# Patient Record
Sex: MALE | Race: WHITE
[De-identification: names, ages, dates, MRNs, and addresses within clinical notes are randomized per-mention and may not be internally consistent; named-entity substitution may affect disease eponyms.]

---

## 2018-10-02 ENCOUNTER — HOSPITAL ENCOUNTER (EMERGENCY)
Dept: HOSPITAL 80 - FED | Age: 68
Discharge: HOME | End: 2018-10-02
Payer: MEDICAID

## 2018-10-02 VITALS — DIASTOLIC BLOOD PRESSURE: 103 MMHG | SYSTOLIC BLOOD PRESSURE: 138 MMHG

## 2018-10-02 DIAGNOSIS — Z85.51: ICD-10-CM

## 2018-10-02 DIAGNOSIS — Z79.4: ICD-10-CM

## 2018-10-02 DIAGNOSIS — M16.12: Primary | ICD-10-CM

## 2018-10-02 DIAGNOSIS — E11.9: ICD-10-CM

## 2018-10-02 DIAGNOSIS — Z87.891: ICD-10-CM

## 2018-10-02 NOTE — EDPHY
General


Time Seen by Provider: 10/02/18 10:03


Narrative: 





CHIEF COMPLAINT: 


Hip pain





HISTORY OF PRESENT ILLNESS: 


Patient presents by private vehicle with complaints of left hip pain. pain has 

been present since January.  He states that he has been seen several times by 

[facility] and [facility] with degenerative hip.  He has a 

pending MRI but does not have an appointment.  He has been taking naproxen and 

Tylenol with no improvement.  He has no new trauma or injury.  No numbness, 

tingling or weakness.  No redness or warmth.  No fever.  He is here asking for 

pain control to get him to his MRI.  No other associated complaints or 

modifying factors.





REVIEW OF SYSTEMS:


10 systems were reviewed and negative with the exception of the elements 

mentioned in the history of present illness.





PCP:


[facility]





SPECIALISTS:


Pending orthopedic follow-up





PAST MEDICAL HISTORY: 


Osteoarthritis, degenerative disease, asthma, bladder cancer, diabetes mellitus





PAST SURGICAL HISTORY:


Cystectomy.  No recent surgeries





SOCIAL HISTORY:


Lives independently.





FAMILY HISTORY:


Noncontributory





EXAMINATION:


General Appearance:  Alert, no distress.  Ambulatory with cane.


Head: normocephalic, atraumatic


Cardiovascular:  Regular rate.  Symmetric DP and PT pulses 2+.  Good signs of 

perfusion of both lower extremities.


Back: non-tender, no bony abnormalities


Neurological:  A&O, nonfocal, antalgic but steady gait.  Strength is symmetric 

in both knees at 5/5.  Patellar reflexes symmetric


Skin:  Warm and dry, no rash.  No cellulitis.  No petechiae.  No purpura


Extremities:  Tenderness of the left greater trochanter and left inguinal fold.

  There is no bony tenderness of the pelvis, knees, shins or ankles.  Range of 

motion symmetric to both lower extremities passive and active.


Psychiatric:  Mood and affect normal





DIFFERENTIAL DIAGNOSES:


Including but not limited to osteoarthritis, labrum tear, dislocation, 

subluxation, septic joint, toxic synovitis








MDM:


10:10 a.m.


Left hip pain that is chronic in nature with diagnosis of osteoarthritis/

degenerative disease.  There is no evidence of toxic synovitis or septic joint.

  There is no cellulitis.  There is full range of motion of the hip with no 

acute changes.  His only request is pain control.  He reports a a normal x-ray 

and CT scan over the past 2 months.  He has not been on any narcotics for this 

and I have verified this.  I have ordered short course of pain medication for 

him and he will continue his workup with [facility] with a pending MR ARACELIS. 

We discussed ED precautions for redness, warmth, fever or worsening pain.  He 

is ambulatory and comfortable this plan.  Discharged home stable condition.








SUPERVISION:


This patient was independently evaluated without direct involvement of or 

examination by the attending physician. 





CONSULTATION:


None











- History


Smoking Status: Former smoker





- Objective


Vital Signs: 


 Initial Vital Signs











Temperature (C)  98.6 F   10/02/18 09:28


 


Heart Rate  78   10/02/18 09:28


 


Respiratory Rate  16   10/02/18 09:28


 


Blood Pressure  138/103 H  10/02/18 09:28


 


O2 Sat (%)  94   10/02/18 09:28








 











O2 Delivery Mode               Room Air














Allergies/Adverse Reactions: 


 





No Known Allergies Allergy (Verified 10/02/18 09:27)


 








Home Medications: 














 Medication  Instructions  Recorded


 


Albuterol  10/02/18


 


Metformin 1000 mg  10/02/18


 


oxyCODONE HCL/ACETAMINOPHEN 1 each PO Q4-6PRN PRN #13 tablet 10/02/18





[Percocet 5-325 mg Tablet]  














Departure





- Departure


Disposition: Home, Routine, Self-Care


Clinical Impression: 


 Left hip pain





Osteoarthritis of left hip


Qualifiers:


 Osteoarthritis type: primary Qualified Code(s): M16.12 - Unilateral primary 

osteoarthritis, left hip





Condition: Good


Instructions:  Arthralgia (ED), Hip Pain (ED)


Additional Instructions: 


1. Continue your previous naproxen medication


2. Pain medication as prescribed as needed.  Do not combine with alcohol, 

marijuana or acetaminophen


3. Contact the [facility] for outpatient follow-up for MRI of the left hip


4. I have also provided the on-call orthopedist for outpatient follow-up if 

needed


5. Return here for any redness over the left hip, fever, worsening pain or 

inability to ambulate


Referrals: 


Genaro Gamez MD [Medical Doctor] - As per Instructions


Prescriptions: 


oxyCODONE HCL/ACETAMINOPHEN [Percocet 5-325 mg Tablet] 1 each PO Q4-6PRN PRN #

13 tablet


 PRN Reason: Pain, Breakthrough

## 2019-01-10 ENCOUNTER — HOSPITAL ENCOUNTER (EMERGENCY)
Dept: HOSPITAL 80 - FED | Age: 69
Discharge: HOME | End: 2019-01-10
Payer: OTHER GOVERNMENT

## 2019-01-10 VITALS — DIASTOLIC BLOOD PRESSURE: 74 MMHG | SYSTOLIC BLOOD PRESSURE: 128 MMHG

## 2019-01-10 DIAGNOSIS — Z85.51: ICD-10-CM

## 2019-01-10 DIAGNOSIS — Z79.4: ICD-10-CM

## 2019-01-10 DIAGNOSIS — E11.9: ICD-10-CM

## 2019-01-10 DIAGNOSIS — M25.552: Primary | ICD-10-CM

## 2019-01-10 NOTE — EDPHY
H & P


Time Seen by Provider: 01/10/19 12:53


HPI/ROS: 





Chief complaint.  Left hip pain





HPI.  Patient is a 68-year-old male with chronic left hip pain.  He has severe 

DJD.  [facility] and [facility] 

[facility].  He has not had a recent fall or injury but the 

pain has increased markedly since yesterday.  He is using the Tylenol and 

Naprosyn with inadequate relief in prior had been sufficient for pain control.  

He did go grocery shopping yesterday and did some yd work around the house and 

now increased pain.  Pain is in the hip and radiates to the left knee.





ROS


10 systems were reviewed and negative with the exception of the elements 

mentioned in the history of present illness





Past Medical/Surgical History: 





Diabetes, bladder cancer, asthma, degenerative joint disease


Social History: 





Single, nonsmoker, no alcohol


Smoking Status: Former smoker


Physical Exam: 





General Appearance:  Alert pleasant well-developed male moderate distress vital 

signs are stable


Eyes: Pupils equal and round no pallor or injection.


ENT, Mouth:  Mucous membranes are moist.


Respiratory:  There are no retractions, lungs are clear to auscultation.


Cardiovascular: Regular rate and rhythm.


Gastrointestinal:   Abdomen is soft and nontender, no masses, bowel sounds 

normal.


Neurological:  Awake and alert, sensory and motor exams grossly normal.


Skin: Warm and dry, no rashes.


Musculoskeletal:  Neck is supple nontender.


Extremities pain in the left hip to palpation and somewhat in the groin.  

Painful range of motion in terms of flexion extension and internal and external 

rotation.  No findings about the knee accepted does somewhat hurt to palpation.

  No obvious swelling or dislocation.  Distal motor vascular sensitivity intact


Psychiatric: Patient is oriented X 3, there is no agitation.


Constitutional: 


 Initial Vital Signs











Temperature (C)  36.4 C   01/10/19 12:29


 


Heart Rate  67   01/10/19 12:29


 


Respiratory Rate  18   01/10/19 12:29


 


Blood Pressure  129/72 H  01/10/19 12:29


 


O2 Sat (%)  97   01/10/19 12:29








 











O2 Delivery Mode               Room Air














Allergies/Adverse Reactions: 


 





No Known Allergies Allergy (Verified 01/10/19 12:27)


 








Home Medications: 














 Medication  Instructions  Recorded


 


Albuterol  10/02/18


 


Metformin 1000 mg  10/02/18


 


Hydrocodone/APAP 5/325 [Norco 1 each PO Q4-6PRN PRN #14 tab 01/10/19





5/325 (*)]  


 


Naproxen  01/10/19














Medical Decision Making





- Diagnostics


Imaging Results: 


 Imaging Impressions





Femur X-Ray  01/10/19 13:34


Impression:


1. Subtle radiolucent features within the weightbearing aspect of the left 

femoral head, not as conspicuous on current study as previous AP pelvis 

radiograph. As discussed previously, left hip MRI examination may be of benefit 

as clinically appropriate.








Pelvis X-Ray  01/10/19 13:36


Impression:


1. Subtle radiolucency involving the weightbearing aspect left femoral head, 

differential considerations as above. MRI examination of the left hip may be of 

benefit in further evaluation as clinically appropriate.








X-ray left hip and femur shows severe DJD left femoral head and acetabulum


ED Course/Re-evaluation: 





Re-evaluation at 3:10 p.m. Patient is stable.  He and I discussed imaging study 

results, treatment plan including criteria for return importance of follow-up 

further evaluation.  He expresses understanding and agreement


Differential Diagnosis: 





Patient is awaiting hip replacement for DJD left hip.  Increased activity 

yesterday with increased pain not fully resolved with a C2 min and naproxen.  

No evidence of fracture dislocation





Departure





- Departure


Disposition: Home, Routine, Self-Care


Clinical Impression: 


 Left hip pain





Condition: Good


Instructions:  Hip Pain (ED)


Additional Instructions: 


Continue Naproxen as prescribed.





Hydrocodone 1 pill every 4-6 hours to help with pain





May use Tylenol and naproxen or hydrocodone and naproxen.  If using hydrocodone 

do not take extra Tylenol





Return for worsening symptoms





[facility]


Referrals: 


NONE *PRIMARY CARE P,. [Primary Care Provider] - As per Instructions


Prescriptions: 


Hydrocodone/APAP 5/325 [Norco 5/325 (*)] 1 each PO Q4-6PRN PRN #14 tab


 PRN Reason: Pain, Moderate

## 2019-01-17 ENCOUNTER — HOSPITAL ENCOUNTER (EMERGENCY)
Dept: HOSPITAL 80 - FED | Age: 69
Discharge: HOME | End: 2019-01-17
Payer: OTHER GOVERNMENT

## 2019-01-17 VITALS — DIASTOLIC BLOOD PRESSURE: 81 MMHG | SYSTOLIC BLOOD PRESSURE: 136 MMHG

## 2019-01-17 DIAGNOSIS — Z79.4: ICD-10-CM

## 2019-01-17 DIAGNOSIS — E11.9: ICD-10-CM

## 2019-01-17 DIAGNOSIS — G89.29: ICD-10-CM

## 2019-01-17 DIAGNOSIS — M25.552: Primary | ICD-10-CM

## 2019-01-17 NOTE — EDPHY
H & P


Time Seen by Provider: 01/17/19 12:59


HPI/ROS: 





HPI


Chronic left hip pain, needs pain medication.





A 68-year-old male by private vehicle.  This patient has a history of chronic 

left hip pain secondary to degenerative arthritis.  [facility].  He states that he is currently trying to get scheduled 

for a left hip replacement through his primary care physician.  He was seen in 

our emergency department on January 10th with the same complaint is today.  He 

was prescribed Vicodin, 14. At that time.  He tells me that he does have a 

appointment to see his primary care physician in early February.  He states 

that he has run out of his pain medication and is asking for a refill of his 

Vicodin prescription.  He has no other complaints.





ROS:





Constitutional:  No fever, no chills.  No weakness.


Musculoskeletal:  No back pain.  No neck pain.  As above.


Skin:  No rashes.


Neurological: No focal weakness or altered sensation.





Past medical history:  Diabetes, bladder cancer, asthma, degenerative joint 

disease, chronic left hip pain.





Social history:  Better in.  Here by himself.  Nonsmoker.  No alcohol.





Physical Exam:





General Appearance:  Alert, no distress.  This patient is responding to 

questions appropriately and in full sentences.  This patient appears well-

hydrated and well-nourished.


Eyes:  Pupils equal and round no pallor or injection.  No lid edema, erythema 

or injection.


Neurological:  Motor sensory function is grossly intact.  Cranial nerves are 

normal.  Walks with a cane secondary to his chronic left hip pain.


Skin:  Warm and dry, no rashes.


Extremities are symmetrical.  All joints range without pain or impingement 

except for the left hip.


Psychiatric:  No agitation.  No depression.





Database:





EKG:





Imaging:





Procedures:





Emergency department course:





Triage vital signs reviewed and are unremarkable.  His emergency department 

notes and workup were reviewed from his previous visit.  This patient presents 

to the emergency department for refill of his Vicodin pain medication 

prescription.  I feel that he does have real pain.  I explained to him that I 

would refill his prescription but that he needed to follow up with his primary 

care physician for ongoing pain management and future pain medication 

prescription refills.  He understands this.  He feels comfortable going home.  

Follow-up and return to emergency department precautions discussed with him.  

All of his questions were answered.  He was discharged from the emergency 

department in good condition.





Differential Diagnosis:





The differential diagnosis on this patient includes but is not limited to 

chronic left hip pain.  Acute fracture, subluxation, dislocation, other acute 

traumatic injury unlikely.  This represents a partial list of diagnoses 

considered.  These considerations are based on history, physical exam, past 

history, reassessment and diagnostic testing.


Smoking Status: Former smoker


Constitutional: 


 Initial Vital Signs











Temperature (C)  36.6 C   01/17/19 12:03


 


Heart Rate  70   01/17/19 12:03


 


Respiratory Rate  18   01/17/19 12:03


 


Blood Pressure  134/75 H  01/17/19 12:03


 


O2 Sat (%)  94   01/17/19 12:03








 











O2 Delivery Mode               Room Air














Allergies/Adverse Reactions: 


 





No Known Allergies Allergy (Verified 01/10/19 12:27)


 








Home Medications: 














 Medication  Instructions  Recorded


 


Albuterol  10/02/18


 


Metformin 1000 mg  10/02/18


 


Hydrocodone/APAP 5/325 [Norco 1 each PO Q4-6PRN PRN #14 tab 01/10/19





5/325 (*)]  


 


Naproxen  01/10/19


 


Hydrocodone/APAP 5/325 [Norco 1 - 2 tab PO Q4-6PRN PRN #14 tab 01/17/19





5/325 (*)]  














Departure





- Departure


Disposition: Home, Routine, Self-Care


Clinical Impression: 


 Chronic left hip pain





Condition: Good


Instructions:  Arthritis (ED)


Additional Instructions: 


Read and follow provided instructions.





Follow-up with your primary care physician as discussed for ongoing management 

of your chronic left hip pain.





Narcotic pain medication:  1-2 every 4-6 hours as needed for pain.  Do not 

drive while on this medication.





Return to the emergency department for worsening symptoms or other serious 

concerns.


Referrals: 


NONE *PRIMARY CARE P,. [Primary Care Provider] - As per Instructions


Prescriptions: 


Hydrocodone/APAP 5/325 [Norco 5/325 (*)] 1 - 2 tab PO Q4-6PRN PRN #14 tab


 PRN Reason: Pain, Moderate